# Patient Record
Sex: FEMALE | Race: BLACK OR AFRICAN AMERICAN | Employment: UNEMPLOYED
[De-identification: names, ages, dates, MRNs, and addresses within clinical notes are randomized per-mention and may not be internally consistent; named-entity substitution may affect disease eponyms.]

---

## 2024-09-16 ENCOUNTER — TELEPHONE (OUTPATIENT)
Facility: HOSPITAL | Age: 54
End: 2024-09-16

## 2024-10-11 ENCOUNTER — HOSPITAL ENCOUNTER (OUTPATIENT)
Facility: HOSPITAL | Age: 54
Setting detail: RECURRING SERIES
Discharge: HOME | End: 2024-10-14
Payer: OTHER GOVERNMENT

## 2024-10-11 PROCEDURE — 97110 THERAPEUTIC EXERCISES: CPT

## 2024-10-11 PROCEDURE — 97535 SELF CARE MNGMENT TRAINING: CPT

## 2024-10-11 PROCEDURE — 97140 MANUAL THERAPY 1/> REGIONS: CPT

## 2024-10-11 NOTE — PROGRESS NOTES
[facility]  [address] [facility] [address] [place] [address] Ph:[phone] Fx: [phone]    PLAN OF CARE/ Statement of Necessity for Physical Therapy Services           Patient name: Nacho Simpson Start of Care: 10/11/2024   Referral source: Jhonny Perkins* : 1970    Medical Diagnosis: Pain in right lower leg [M79.661]       Onset Date:  2024   Treatment Diagnosis: M25.571  RIGHT ANKLE PAIN                                      Prior Hospitalization: see medical history Provider#: [id number]   Medications: Verified on Patient Summary List     Comorbidities: BMI > 30, HTN    Prior Level of Function: The patient reports she was able to ambulate for recreation and exercise prior to onset.    The Plan of Care and following information is based on the information from the initial evaluation.    Assessment / key information:  The patient is a 54 year old female with a chief complaint of left ankle/heel pain that has been ongoing for over 6 months. She states that she had a prior ankle pain about a year or so ago with radiographs and eventual resolution, but reports that her ankle began hurting this year when she began a walking program. She cites pain in the back of the heel and states she will get burning. The patient denies imaging nor injection. She denies night splints. The patient has impairments consisting of pain, decreased ROM, decreased flexibility, decreased quality of life, and decreased ease of ambulation. She will benefit from skilled PT in order to address the aforementioned impairments.     Evaluation Complexity:  History:  MEDIUM  Complexity : 1-2 comorbidities / personal factors will impact the outcome/ POC ; Examination:  LOW Complexity : 1-2 Standardized tests and measures addressing body structure, function, activity limitation and / or participation in recreation  ;Presentation:  MEDIUM Complexity : Evolving with changing 
detail:       Objective Information/Functional Measures/Assessment    See POC    Patient will continue to benefit from skilled PT / OT services to modify and progress therapeutic interventions, analyze and address functional mobility deficits, analyze and address ROM deficits, analyze and address strength deficits, analyze and address soft tissue restrictions, analyze and cue for proper movement patterns, analyze and modify for postural abnormalities, and instruct in home and community integration to address functional deficits and attain remaining goals.    Progress toward goals / Updated goals:  []  See Progress Note/Recertification  Short Term Goals: To be accomplished in 2 weeks  The patient will demonstrate independence and compliance with HEP to maximize therapeutic benefit.   IE: issued HEP  The patient will improve DF ROM to 5 degrees actively to improve ease of ambulation.   IE: lacking 3 degrees from neutral  Long Term Goals: To be accomplished in 12 weeks  The patient will improve LEFS score to 47/80 in order to improve quality of life.   IE:   The patient will attain 12 degrees gastroc flexibility in order to improve ease of gait.   IE: lacking 3 degrees from neutral  The patient will attain 5/5 MMT posterior tib strength to improve stability in stance.   IE: 4+/5 MMT inversion  The patient will improve hip ABD strength of right hip to 5/5 MMT to maximize stability in stance.    IE: 3+/5 MMT    PLAN  Yes  Continue plan of care  []  Upgrade activities as tolerated  []  Discharge due to :  []  Other:    Dallas Wetzel PT    10/11/2024    10:56 AM    Future Appointments   Date Time Provider Department Center   10/15/2024  2:30 PM Dallas Wetzel PT [facility] [place]   10/18/2024  9:10 AM Ty Womack P[facility] [place]   10/21/2024 12:30 PM Ty Womack PTA [facility] [place]   10/23/2024  8:30 [facility] [place]   10/29/2024  9:10 AM [place]

## 2024-10-15 ENCOUNTER — HOSPITAL ENCOUNTER (OUTPATIENT)
Facility: HOSPITAL | Age: 54
Setting detail: RECURRING SERIES
Discharge: HOME | End: 2024-10-18
Payer: OTHER GOVERNMENT

## 2024-10-15 PROCEDURE — 97112 NEUROMUSCULAR REEDUCATION: CPT

## 2024-10-15 PROCEDURE — 97110 THERAPEUTIC EXERCISES: CPT

## 2024-10-15 PROCEDURE — 97140 MANUAL THERAPY 1/> REGIONS: CPT

## 2024-10-15 NOTE — PROGRESS NOTES
PHYSICAL / OCCUPATIONAL THERAPY - DAILY TREATMENT NOTE     Patient Name: Nacho Simpson    Date: 10/15/2024    : 1970  Insurance: Payor:  EAST / Plan:  EAST PRIME / Product Type: *No Product type* /      Patient  verified Yes     Visit #   Current / Total 2 24   Time   In / Out 2:30 3:10   Pain   In / Out 2-3/10 1-2/10   Subjective Functional Status/Changes: The patient reports compliance with HEP. She states she is not used to using her supportive shoes.   Changes to:  Allergies, Med Hx, Sx Hx?   no       TREATMENT AREA =  Pain in right ankle and joints of right foot [M25.571]    If an interpreting service is utilized for treatment of this patient, the contents of this document represent the material reviewed with the patient via the .     OBJECTIVE  Therapeutic Procedures:  Tx Min Billable or 1:1 Min (if diff from Tx Min) Procedure, Rationale, Specifics   22  82184 Therapeutic Exercise (timed):  increase ROM, strength, coordination, balance, and proprioception to improve patient's ability to progress to PLOF and address remaining functional goals. (see flow sheet as applicable)    Details if applicable:       10  49120 Neuromuscular Re-Education (timed):  improve balance, coordination, kinesthetic sense, posture, core stability and proprioception to improve patient's ability to develop conscious control of individual muscles and awareness of position of extremities in order to progress to PLOF and address remaining functional goals. (see flow sheet as applicable)    Details if applicable:     8  09966 Manual Therapy (timed):  decrease pain, increase ROM, and increase tissue extensibility to improve patient's ability to progress to PLOF and address remaining functional goals.  The manual therapy interventions were performed at a separate and distinct time from the therapeutic activities interventions . Details: achilles, soleus, gastroc Graston GT5 with light pressure, pt

## 2024-10-18 ENCOUNTER — HOSPITAL ENCOUNTER (OUTPATIENT)
Facility: HOSPITAL | Age: 54
Setting detail: RECURRING SERIES
Discharge: HOME | End: 2024-10-21
Payer: OTHER GOVERNMENT

## 2024-10-18 PROCEDURE — 97110 THERAPEUTIC EXERCISES: CPT

## 2024-10-18 PROCEDURE — 97140 MANUAL THERAPY 1/> REGIONS: CPT

## 2024-10-18 PROCEDURE — 97112 NEUROMUSCULAR REEDUCATION: CPT

## 2024-10-18 NOTE — PROGRESS NOTES
PHYSICAL / OCCUPATIONAL THERAPY - DAILY TREATMENT NOTE     Patient Name: Nacho Simpson    Date: 10/18/2024    : 1970  Insurance: Payor:  EAST / Plan:  EAST PRIME / Product Type: *No Product type* /      Patient  verified Yes     Visit #   Current / Total 3 24   Time   In / Out 9:10 9:50   Pain   In / Out 3-/10 0/10   Subjective Functional Status/Changes: Pt reports ankle is irritated today.   Changes to:  Allergies, Med Hx, Sx Hx?   no       TREATMENT AREA =  Pain in right ankle and joints of right foot [M25.571]    If an interpreting service is utilized for treatment of this patient, the contents of this document represent the material reviewed with the patient via the .     OBJECTIVE    Therapeutic Procedures:  Tx Min Billable or 1:1 Min (if diff from Tx Min) Procedure, Rationale, Specifics   24  31292 Therapeutic Exercise (timed):  increase ROM, strength, coordination, balance, and proprioception to improve patient's ability to progress to PLOF and address remaining functional goals. (see flow sheet as applicable)    Details if applicable:       8  36799 Neuromuscular Re-Education (timed):  improve balance, coordination, kinesthetic sense, posture, core stability and proprioception to improve patient's ability to develop conscious control of individual muscles and awareness of position of extremities in order to progress to PLOF and address remaining functional goals. (see flow sheet as applicable)    Details if applicable:  Squats, bridges.   8  47500 Manual Therapy (timed):  decrease pain, increase ROM, increase tissue extensibility, and decrease trigger points to improve patient's ability to progress to PLOF and address remaining functional goals.  The manual therapy interventions were performed at a separate and distinct time from the therapeutic activities interventions . Details:        Details if applicable:  Graston technique to Right gastroc, soleus and achilles

## 2024-10-21 ENCOUNTER — HOSPITAL ENCOUNTER (OUTPATIENT)
Facility: HOSPITAL | Age: 54
Setting detail: RECURRING SERIES
Discharge: HOME | End: 2024-10-24
Payer: OTHER GOVERNMENT

## 2024-10-21 PROCEDURE — 97112 NEUROMUSCULAR REEDUCATION: CPT

## 2024-10-21 PROCEDURE — 97110 THERAPEUTIC EXERCISES: CPT

## 2024-10-21 PROCEDURE — 97140 MANUAL THERAPY 1/> REGIONS: CPT

## 2024-10-21 NOTE — PROGRESS NOTES
PHYSICAL / OCCUPATIONAL THERAPY - DAILY TREATMENT NOTE     Patient Name: Nacho Simpson    Date: 10/21/2024    : 1970  Insurance: Payor:  EAST / Plan:  EAST PRIME / Product Type: *No Product type* /      Patient  verified Yes     Visit #   Current / Total 4 24   Time   In / Out 12:30 1:06   Pain   In / Out 3/10 2/10   Subjective Functional Status/Changes: Pt reports Right ankle is sore/painful today.   Changes to:  Allergies, Med Hx, Sx Hx?   no       TREATMENT AREA =  Pain in right ankle and joints of right foot [M25.571]    If an interpreting service is utilized for treatment of this patient, the contents of this document represent the material reviewed with the patient via the .     OBJECTIVE    Therapeutic Procedures:  Tx Min Billable or 1:1 Min (if diff from Tx Min) Procedure, Rationale, Specifics   20  14576 Therapeutic Exercise (timed):  increase ROM, strength, coordination, balance, and proprioception to improve patient's ability to progress to PLOF and address remaining functional goals. (see flow sheet as applicable)    Details if applicable:       8  28497 Neuromuscular Re-Education (timed):  improve balance, coordination, kinesthetic sense, posture, core stability and proprioception to improve patient's ability to develop conscious control of individual muscles and awareness of position of extremities in order to progress to PLOF and address remaining functional goals. (see flow sheet as applicable)    Details if applicable:  Squats, bridges.    8  05745 Manual Therapy (timed):  decrease pain, increase ROM, increase tissue extensibility, and decrease trigger points to improve patient's ability to progress to PLOF and address remaining functional goals.  The manual therapy interventions were performed at a separate and distinct time from the therapeutic activities interventions . Details:        Details if applicable:   Graston technique to Right gastroc, soleus

## 2024-10-22 ENCOUNTER — APPOINTMENT (OUTPATIENT)
Facility: HOSPITAL | Age: 54
End: 2024-10-22
Payer: OTHER GOVERNMENT

## 2024-10-23 ENCOUNTER — HOSPITAL ENCOUNTER (OUTPATIENT)
Facility: HOSPITAL | Age: 54
Setting detail: RECURRING SERIES
Discharge: HOME | End: 2024-10-26
Payer: OTHER GOVERNMENT

## 2024-10-23 PROCEDURE — 97110 THERAPEUTIC EXERCISES: CPT

## 2024-10-23 PROCEDURE — 97140 MANUAL THERAPY 1/> REGIONS: CPT

## 2024-10-23 PROCEDURE — 97112 NEUROMUSCULAR REEDUCATION: CPT

## 2024-10-23 NOTE — PROGRESS NOTES
PHYSICAL / OCCUPATIONAL THERAPY - DAILY TREATMENT NOTE     Patient Name: Nacho Simpson    Date: 10/23/2024    : 1970  Insurance: Payor:  EAST / Plan:  EAST PRIME / Product Type: *No Product type* /      Patient  verified Yes     Visit #   Current / Total 5 24   Time   In / Out 8:33 9:06   Pain   In / Out 1/10 0/10   Subjective Functional Status/Changes: Reports less pain today.   Changes to:  Allergies, Med Hx, Sx Hx?   no       TREATMENT AREA =  Pain in right ankle and joints of right foot [M25.571]    If an interpreting service is utilized for treatment of this patient, the contents of this document represent the material reviewed with the patient via the .     OBJECTIVE    Therapeutic Procedures:  Tx Min Billable or 1:1 Min (if diff from Tx Min) Procedure, Rationale, Specifics   17  40655 Therapeutic Exercise (timed):  increase ROM, strength, coordination, balance, and proprioception to improve patient's ability to progress to PLOF and address remaining functional goals. (see flow sheet as applicable)    Details if applicable:       8  02362 Neuromuscular Re-Education (timed):  improve balance, coordination, kinesthetic sense, posture, core stability and proprioception to improve patient's ability to develop conscious control of individual muscles and awareness of position of extremities in order to progress to PLOF and address remaining functional goals. (see flow sheet as applicable)    Details if applicable:  Squats, bridges.    8  96671 Manual Therapy (timed):  decrease pain, increase ROM, increase tissue extensibility, and decrease trigger points to improve patient's ability to progress to PLOF and address remaining functional goals.  The manual therapy interventions were performed at a separate and distinct time from the therapeutic activities interventions . Details:        Details if applicable:  Graston technique to Right gastroc, soleus and achilles tendon. Pt

## 2024-10-29 ENCOUNTER — HOSPITAL ENCOUNTER (OUTPATIENT)
Facility: HOSPITAL | Age: 54
Setting detail: RECURRING SERIES
Discharge: HOME | End: 2024-11-01
Payer: OTHER GOVERNMENT

## 2024-10-29 PROCEDURE — 97140 MANUAL THERAPY 1/> REGIONS: CPT

## 2024-10-29 PROCEDURE — 97110 THERAPEUTIC EXERCISES: CPT

## 2024-10-29 PROCEDURE — 97112 NEUROMUSCULAR REEDUCATION: CPT

## 2024-10-29 NOTE — PROGRESS NOTES
PHYSICAL / OCCUPATIONAL THERAPY - DAILY TREATMENT NOTE     Patient Name: Nacho Simpson    Date: 10/29/2024    : 1970  Insurance: Payor:  EAST / Plan:  EAST PRIME / Product Type: *No Product type* /      Patient  verified Yes     Visit #   Current / Total 6 24   Time   In / Out 9:09 9:50   Pain   In / Out 0 0   Subjective Functional Status/Changes: No new changes reported   Changes to:  Allergies, Med Hx, Sx Hx?   no       TREATMENT AREA =  Pain in right ankle and joints of right foot [M25.571]    If an interpreting service is utilized for treatment of this patient, the contents of this document represent the material reviewed with the patient via the .     OBJECTIVE        Therapeutic Procedures:  Tx Min Billable or 1:1 Min (if diff from Tx Min) Procedure, Rationale, Specifics   24  71349 Therapeutic Exercise (timed):  increase ROM, strength, coordination, balance, and proprioception to improve patient's ability to progress to PLOF and address remaining functional goals. (see flow sheet as applicable)    Details if applicable:       9  49397 Neuromuscular Re-Education (timed):  improve balance, coordination, kinesthetic sense, posture, core stability and proprioception to improve patient's ability to develop conscious control of individual muscles and awareness of position of extremities in order to progress to PLOF and address remaining functional goals. (see flow sheet as applicable)    Details if applicable:     8  15468 Manual Therapy (timed):  decrease pain, increase ROM, and increase tissue extensibility to improve patient's ability to progress to PLOF and address remaining functional goals.  The manual therapy interventions were performed at a separate and distinct time from the therapeutic activities interventions . Details: IASTM using Graston on R gastroc, soleus and proximal to achilles with pt in prone.      Details if applicable:           Details if applicable:

## 2024-10-31 ENCOUNTER — HOSPITAL ENCOUNTER (OUTPATIENT)
Facility: HOSPITAL | Age: 54
Setting detail: RECURRING SERIES
Discharge: HOME | End: 2024-11-03
Payer: OTHER GOVERNMENT

## 2024-10-31 PROCEDURE — 97110 THERAPEUTIC EXERCISES: CPT

## 2024-10-31 PROCEDURE — 97112 NEUROMUSCULAR REEDUCATION: CPT

## 2024-10-31 PROCEDURE — 97140 MANUAL THERAPY 1/> REGIONS: CPT

## 2024-10-31 NOTE — PROGRESS NOTES
PHYSICAL / OCCUPATIONAL THERAPY - DAILY TREATMENT NOTE     Patient Name: Nacho Simpson    Date: 10/31/2024    : 1970  Insurance: Payor:  EAST / Plan:  EAST PRIME / Product Type: *No Product type* /      Patient  verified Yes     Visit #   Current / Total 7 24   Time   In / Out 9:51 10:30   Pain   In / Out 2 0   Subjective Functional Status/Changes: Pt reported that with he hard shoes her heel hurts   Changes to:  Allergies, Med Hx, Sx Hx?   no       TREATMENT AREA =  Pain in right ankle and joints of right foot [M25.571]    If an interpreting service is utilized for treatment of this patient, the contents of this document represent the material reviewed with the patient via the .     OBJECTIVE      Therapeutic Procedures:  Tx Min Billable or 1:1 Min (if diff from Tx Min) Procedure, Rationale, Specifics   23  18175 Therapeutic Exercise (timed):  increase ROM, strength, coordination, balance, and proprioception to improve patient's ability to progress to PLOF and address remaining functional goals. (see flow sheet as applicable)    Details if applicable:       8  16741 Neuromuscular Re-Education (timed):  improve balance, coordination, kinesthetic sense, posture, core stability and proprioception to improve patient's ability to develop conscious control of individual muscles and awareness of position of extremities in order to progress to PLOF and address remaining functional goals. (see flow sheet as applicable)    Details if applicable:     8  41680 Manual Therapy (timed):  decrease pain, increase ROM, and increase tissue extensibility to improve patient's ability to progress to PLOF and address remaining functional goals.  The manual therapy interventions were performed at a separate and distinct time from the therapeutic activities interventions . Details:    IASTM using Graston on R gastroc, soleus and proximal to achilles with pt in prone     Details if applicable:

## 2024-11-01 ENCOUNTER — APPOINTMENT (OUTPATIENT)
Facility: HOSPITAL | Age: 54
End: 2024-11-01
Payer: OTHER GOVERNMENT

## 2024-11-05 ENCOUNTER — HOSPITAL ENCOUNTER (OUTPATIENT)
Facility: HOSPITAL | Age: 54
Setting detail: RECURRING SERIES
Discharge: HOME | End: 2024-11-08
Payer: OTHER GOVERNMENT

## 2024-11-05 PROCEDURE — 97530 THERAPEUTIC ACTIVITIES: CPT

## 2024-11-05 PROCEDURE — 97140 MANUAL THERAPY 1/> REGIONS: CPT

## 2024-11-05 PROCEDURE — 97110 THERAPEUTIC EXERCISES: CPT

## 2024-11-05 PROCEDURE — 97112 NEUROMUSCULAR REEDUCATION: CPT

## 2024-11-05 NOTE — PROGRESS NOTES
[facility]  [address] [facility] [address] [place] [address] - Ph: [phone]   Fx: [phone]    PHYSICAL THERAPY PROGRESS NOTE        Patient name: Nacho Simpson Start of Care: 10/11/2024   Referral source: Jhonny Perkins Diane* : 1970               Medical Diagnosis: Pain in right lower leg [M79.661]        Onset Date:  2024   Treatment Diagnosis: M25.571  RIGHT ANKLE PAIN                                      Prior Hospitalization: see medical history Provider#: [id number]   Medications: Verified on Patient Summary List      Comorbidities: BMI > 30, HTN     Prior Level of Function: The patient reports she was able to ambulate for recreation and exercise prior to onset.    Reporting Period: 10/11/2024-2024  Visits from Start of Care: 8    Missed Visits: 0    Goals/Measure of Progress: To be achieved in 8 weeks:  Progress toward goals / Updated goals:  []  See Progress Note/Recertification  Short Term Goals: To be accomplished in 2 weeks  The patient will demonstrate independence and compliance with HEP to maximize therapeutic benefit.              IE: issued HEP              PN 2024: The patient reports compliance  The patient will improve DF ROM to 5 degrees actively to improve ease of ambulation.              IE: lacking 3 degrees from neutral               PN 2024: Progressing, 1 degree  Long Term Goals: To be accomplished in 12 weeks  The patient will improve LEFS score to 65/80 in order to improve quality of life.              IE: 47/80  PN 2024: Met, LEFS score 68/80  The patient will attain 12 degrees gastroc flexibility in order to improve ease of gait.              IE: lacking 3 degrees from neutral              PN 2024: Progressing, 3 degrees.   The patient will attain 5/5 MMT posterior tib strength to improve stability in stance.              IE: 4+/5 MMT inversion  PN 2024: Right Inversion

## 2024-11-05 NOTE — PROGRESS NOTES
PHYSICAL / OCCUPATIONAL THERAPY - DAILY TREATMENT NOTE     Patient Name: Nacho Simpson    Date: 2024    : 1970  Insurance: Payor:  EAST / Plan:  EAST PRIME / Product Type: *No Product type* /      Patient  verified Yes     Visit #   Current / Total 8 24   Time   In / Out 9:50 10:29   Pain   In / Out 4/10 010   Subjective Functional Status/Changes: The patient reports that she began having pain through the ball of her foot (right) on Saturday. She states the achilles has improved.   Changes to:  Allergies, Med Hx, Sx Hx?   no       TREATMENT AREA =  Pain in right ankle and joints of right foot [M25.571]    If an interpreting service is utilized for treatment of this patient, the contents of this document represent the material reviewed with the patient via the .     OBJECTIVE  Therapeutic Procedures:  Tx Min Billable or 1:1 Min (if diff from Tx Min) Procedure, Rationale, Specifics   13  63761 Therapeutic Exercise (timed):  increase ROM, strength, coordination, balance, and proprioception to improve patient's ability to progress to PLOF and address remaining functional goals. (see flow sheet as applicable)    Details if applicable:       8  73981 Neuromuscular Re-Education (timed):  improve balance, coordination, kinesthetic sense, posture, core stability and proprioception to improve patient's ability to develop conscious control of individual muscles and awareness of position of extremities in order to progress to PLOF and address remaining functional goals. (see flow sheet as applicable)    Details if applicable:     10  92693 Therapeutic Activity (timed):  use of dynamic activities replicating functional movements to increase ROM, strength, coordination, balance, and proprioception in order to improve patient's ability to progress to PLOF and address remaining functional goals.  (see flow sheet as applicable)     Details if applicable:     8  98980 Manual Therapy

## 2024-11-08 ENCOUNTER — HOSPITAL ENCOUNTER (OUTPATIENT)
Facility: HOSPITAL | Age: 54
Setting detail: RECURRING SERIES
Discharge: HOME | End: 2024-11-11
Payer: OTHER GOVERNMENT

## 2024-11-08 PROCEDURE — 97110 THERAPEUTIC EXERCISES: CPT

## 2024-11-08 PROCEDURE — 97530 THERAPEUTIC ACTIVITIES: CPT

## 2024-11-08 PROCEDURE — 97112 NEUROMUSCULAR REEDUCATION: CPT

## 2024-11-08 NOTE — PROGRESS NOTES
be accomplished in 12 weeks  The patient will improve LEFS score to 65/80 in order to improve quality of life.              IE: 47/80  PN 11/05/2024: Met, LEFS score 68/80  The patient will attain 12 degrees gastroc flexibility in order to improve ease of gait.              IE: lacking 3 degrees from neutral              PN 11/05/2024: Progressing, 3 degrees.   The patient will attain 5/5 MMT posterior tib strength to improve stability in stance.              IE: 4+/5 MMT inversion  PN 11/05/2024: Right Inversion strength 4+/5,    The patient will improve hip ABD strength of right hip to 5/5 MMT to maximize stability in stance.               IE: 3+/5 MMT              PN 11/05/2024: Remains 3+/5 MMT      PLAN  Yes  Continue plan of care  []  Upgrade activities as tolerated  []  Discharge due to :  []  Other:    Lesvia Khan PT    11/8/2024    8:42 AM    Future Appointments   Date Time Provider Department Center   11/8/2024  9:50 AM Lesvia Khan PT [facility]   11/13/2024 10:30 AM Kierra Quintanilla PT [facility]   11/14/2024  1:50 [facility]

## 2024-11-13 ENCOUNTER — HOSPITAL ENCOUNTER (OUTPATIENT)
Facility: HOSPITAL | Age: 54
Setting detail: RECURRING SERIES
Discharge: HOME | End: 2024-11-16
Payer: OTHER GOVERNMENT

## 2024-11-13 PROCEDURE — 97140 MANUAL THERAPY 1/> REGIONS: CPT

## 2024-11-13 PROCEDURE — 97112 NEUROMUSCULAR REEDUCATION: CPT

## 2024-11-13 PROCEDURE — 97110 THERAPEUTIC EXERCISES: CPT

## 2024-11-13 NOTE — PROGRESS NOTES
PHYSICAL / OCCUPATIONAL THERAPY - DAILY TREATMENT NOTE     Patient Name: Nacho Simpson    Date: 2024    : 1970  Insurance: Payor:  EAST / Plan:  EAST PRIME / Product Type: *No Product type* /      Patient  verified Yes     Visit #   Current / Total 10 24   Time   In / Out 10:37 11:13   Pain   In / Out 0 0   Subjective Functional Status/Changes: No new changes reported   Changes to:  Allergies, Med Hx, Sx Hx?   no       TREATMENT AREA =  Pain in right ankle and joints of right foot [M25.571]    If an interpreting service is utilized for treatment of this patient, the contents of this document represent the material reviewed with the patient via the .     OBJECTIVE      Therapeutic Procedures:  Tx Min Billable or 1:1 Min (if diff from Tx Min) Procedure, Rationale, Specifics   18  14146 Therapeutic Exercise (timed):  increase ROM, strength, coordination, balance, and proprioception to improve patient's ability to progress to PLOF and address remaining functional goals. (see flow sheet as applicable)    Details if applicable:       10  48249 Neuromuscular Re-Education (timed):  improve balance, coordination, kinesthetic sense, posture, core stability and proprioception to improve patient's ability to develop conscious control of individual muscles and awareness of position of extremities in order to progress to PLOF and address remaining functional goals. (see flow sheet as applicable)    Details if applicable:     8  39250 Manual Therapy (timed):  decrease pain, increase ROM, and increase tissue extensibility to improve patient's ability to progress to PLOF and address remaining functional goals.  The manual therapy interventions were performed at a separate and distinct time from the therapeutic activities interventions . Details:     IASTM using Graston on R gastroc, soleus and proximal to achilles with pt in prone     Details if applicable:           Details if

## 2024-11-14 ENCOUNTER — HOSPITAL ENCOUNTER (OUTPATIENT)
Facility: HOSPITAL | Age: 54
Setting detail: RECURRING SERIES
Discharge: HOME | End: 2024-11-17
Payer: OTHER GOVERNMENT

## 2024-11-14 PROCEDURE — 97112 NEUROMUSCULAR REEDUCATION: CPT

## 2024-11-14 PROCEDURE — 97110 THERAPEUTIC EXERCISES: CPT

## 2024-11-14 PROCEDURE — 97140 MANUAL THERAPY 1/> REGIONS: CPT

## 2024-11-14 NOTE — PROGRESS NOTES
PHYSICAL / OCCUPATIONAL THERAPY - DAILY TREATMENT NOTE     Patient Name: Nacho Simpson    Date: 2024    : 1970  Insurance: Payor:  EAST / Plan:  EAST PRIME / Product Type: *No Product type* /      Patient  verified Yes     Visit #   Current / Total 11 24   Time   In / Out 1:50 2:45   Pain   In / Out 1-2 0   Subjective Functional Status/Changes: Pt reported feeling tired today   Changes to:  Allergies, Med Hx, Sx Hx?   no       TREATMENT AREA =  Pain in right ankle and joints of right foot [M25.571]    If an interpreting service is utilized for treatment of this patient, the contents of this document represent the material reviewed with the patient via the .     OBJECTIVE    Modalities Rationale:     decrease edema, decrease pain, and increase tissue extensibility to improve patient's ability to progress to PLOF and address remaining functional goals.     min [] Estim Unattended, type/location:                                      []  w/ice    []  w/heat    min [] Estim Attended, type/location:                                     []  w/US     []  w/ice    []  w/heat    []  TENS insruct      min []  Mechanical Traction: type/lbs                   []  pro   []  sup   []  int   []  cont    []  before manual    []  after manual    min []  Ultrasound, settings/location:      min []  Iontophoresis w/ dexamethasone, location:                                               []  take home patch       []  in clinic        min  unbilled []  Ice     []  Heat    location/position:     min []  Paraffin,  details:    10 min [x]  Vasopneumatic Device, press/temp: LP/LT    min []  Whirlpool / Fluido:    If using vaso (only need to measure limb vaso being performed on)      pre-treatment girth : 51 cms      post-treatment girth : 51 cms      measured at (landmark location) :  Right ankle figure 8    min []  Other:    Skin assessment post-treatment (if applicable):    []  intact    []

## 2024-11-15 ENCOUNTER — APPOINTMENT (OUTPATIENT)
Facility: HOSPITAL | Age: 54
End: 2024-11-15
Payer: OTHER GOVERNMENT

## 2024-11-26 ENCOUNTER — HOSPITAL ENCOUNTER (OUTPATIENT)
Facility: HOSPITAL | Age: 54
Setting detail: RECURRING SERIES
Discharge: HOME | End: 2024-11-29
Payer: OTHER GOVERNMENT

## 2024-11-26 PROCEDURE — 97110 THERAPEUTIC EXERCISES: CPT

## 2024-11-26 PROCEDURE — 97112 NEUROMUSCULAR REEDUCATION: CPT

## 2024-11-26 PROCEDURE — 97016 VASOPNEUMATIC DEVICE THERAPY: CPT

## 2024-11-26 PROCEDURE — 97140 MANUAL THERAPY 1/> REGIONS: CPT

## 2024-11-26 NOTE — PROGRESS NOTES
Current: Progressed, Right hip abduction strength 4-/5, 11/13/2024    PLAN  Yes  Continue plan of care  []  Upgrade activities as tolerated  []  Discharge due to :  []  Other:    Kierra Quintanilla PT    11/26/2024    12:01 PM    Future Appointments   Date Time Provider Department Center   12/3/2024  1:10 PM K[facility] Quintanilla [facility]   12/10/2024 11:50 AM Kierra Quintanilla PT [facility]   12/17/2024  1:10 PM Kierra Quintanilla PT [facility]

## 2024-12-03 ENCOUNTER — HOSPITAL ENCOUNTER (OUTPATIENT)
Facility: HOSPITAL | Age: 54
Setting detail: RECURRING SERIES
Discharge: HOME | End: 2024-12-06
Payer: OTHER GOVERNMENT

## 2024-12-03 PROCEDURE — 97112 NEUROMUSCULAR REEDUCATION: CPT

## 2024-12-03 PROCEDURE — 97016 VASOPNEUMATIC DEVICE THERAPY: CPT

## 2024-12-03 PROCEDURE — 97140 MANUAL THERAPY 1/> REGIONS: CPT

## 2024-12-03 PROCEDURE — 97110 THERAPEUTIC EXERCISES: CPT

## 2024-12-03 NOTE — PROGRESS NOTES
[facility]  [address] [place] [address] - Ph: [phone]   Fx: [phone]    PHYSICAL THERAPY PROGRESS NOTE      Patient name: Nacho Simpson Start of Care: 10/11/2024    Referral source: Gregorio Jhonny Contreras* : 1970    Medical Diagnosis: Pain in right ankle and joints of right foot [M25.571]  Payor:  EAST / Plan:  EAST PRIME / Product Type: *No Product type* /  Onset Date:2024     Treatment Diagnosis: M25.571 RIGHT ANKLE PAIN    Prior Hospitalization: see medical history Provider#: [id number]   Comorbidities: BMI > 30, HTN   Prior Level of Function:The patient reports she was able to ambulate for recreation and exercise prior to onset.     Reporting Period: 10/11/2024-2024  Visits from Start of Care: 13    Missed Visits: 0    Goals/Measure of Progress: To be achieved in 12 weeks:    Short Term Goals: To be accomplished in 2 weeks  The patient will demonstrate independence and compliance with HEP to maximize therapeutic benefit.              IE: issued HEP              PN 2024: Met, pt reports compliance  The patient will improve DF ROM to 5 degrees actively to improve ease of ambulation.              IE: lacking 3 degrees from neutral  PN: 2024; Met, R DF AROM 10 deg    Long Term Goals: To be accomplished in 12 weeks  The patient will improve LEFS score to 65/80 in order to improve quality of life.              IE: 47/80  PN 2024: Met, LEFS score 65/80  The patient will attain 12 degrees gastroc flexibility in order to improve ease of gait.              IE: lacking 3 degrees from neutral              PN 2024: Progressed, 10 deg  The patient will attain 5/5 MMT posterior tib strength to improve stability in stance.              IE: 4+/5 MMT inversion  PN 2024: Right Inversion strength 4+/5,    The patient will improve hip ABD strength of right hip to 5/5 MMT to maximize

## 2024-12-03 NOTE — PROGRESS NOTES
PHYSICAL / OCCUPATIONAL THERAPY - DAILY TREATMENT NOTE     Patient Name: Nacho Simpson    Date: 12/3/2024    : 1970  Insurance: Payor: [email or web address] EAST / Plan:  EAST PRIME / Product Type: *No Product type* /      Patient  verified Yes     Visit #   Current / Total 13 24   Time   In / Out 1:10 2:06   Pain   In / Out 0 0   Subjective Functional Status/Changes: No new changes reported   Changes to:  Allergies, Med Hx, Sx Hx?   no       TREATMENT AREA =  Pain in right ankle and joints of right foot [M25.571]    If an interpreting service is utilized for treatment of this patient, the contents of this document represent the material reviewed with the patient via the .     OBJECTIVE    Modalities Rationale:     decrease edema, decrease pain, and increase tissue extensibility to improve patient's ability to progress to PLOF and address remaining functional goals.     min [] Estim Unattended, type/location:                                      []  w/ice    []  w/heat    min [] Estim Attended, type/location:                                     []  w/US     []  w/ice    []  w/heat    []  TENS insruct      min []  Mechanical Traction: type/lbs                   []  pro   []  sup   []  int   []  cont    []  before manual    []  after manual    min []  Ultrasound, settings/location:      min []  Iontophoresis w/ dexamethasone, location:                                               []  take home patch       []  in clinic        min  unbilled []  Ice     []  Heat    location/position:     min []  Paraffin,  details:    10 min [x]  Vasopneumatic Device, press/temp: LP/LT    min []  Whirlpool / Fluido:    If using vaso (only need to measure limb vaso being performed on)      pre-treatment girth : 58 cms      post-treatment girth : 58 cms      measured at (landmark location) :  Figure 8- R ankle    min []  Other:    Skin assessment post-treatment (if applicable):    []  intact    []  redness- no

## 2024-12-10 ENCOUNTER — HOSPITAL ENCOUNTER (OUTPATIENT)
Facility: HOSPITAL | Age: 54
Setting detail: RECURRING SERIES
Discharge: HOME | End: 2024-12-13
Payer: OTHER GOVERNMENT

## 2024-12-10 PROCEDURE — 97112 NEUROMUSCULAR REEDUCATION: CPT

## 2024-12-10 PROCEDURE — 97140 MANUAL THERAPY 1/> REGIONS: CPT

## 2024-12-10 PROCEDURE — 97110 THERAPEUTIC EXERCISES: CPT

## 2024-12-10 NOTE — PROGRESS NOTES
PHYSICAL / OCCUPATIONAL THERAPY - DAILY TREATMENT NOTE     Patient Name: Nacho Simpson    Date: 12/10/2024    : 1970  Insurance: Payor:  EAST / Plan:  EAST PRIME / Product Type: *No Product type* /      Patient  verified Yes     Visit #   Current / Total 14 24   Time   In / Out 11:50 12:32   Pain   In / Out 1-2 0   Subjective Functional Status/Changes: No new changes reported   Changes to:  Allergies, Med Hx, Sx Hx?   no       TREATMENT AREA =  Pain in right ankle and joints of right foot [M25.571]    If an interpreting service is utilized for treatment of this patient, the contents of this document represent the material reviewed with the patient via the .     OBJECTIVE      Therapeutic Procedures:  Tx Min Billable or 1:1 Min (if diff from Tx Min) Procedure, Rationale, Specifics   24  67357 Therapeutic Exercise (timed):  increase ROM, strength, coordination, balance, and proprioception to improve patient's ability to progress to PLOF and address remaining functional goals. (see flow sheet as applicable)    Details if applicable:       10  08725 Neuromuscular Re-Education (timed):  improve balance, coordination, kinesthetic sense, posture, core stability and proprioception to improve patient's ability to develop conscious control of individual muscles and awareness of position of extremities in order to progress to PLOF and address remaining functional goals. (see flow sheet as applicable)    Details if applicable:     8  39341 Manual Therapy (timed):  decrease pain, increase ROM, and increase tissue extensibility to improve patient's ability to progress to PLOF and address remaining functional goals.  The manual therapy interventions were performed at a separate and distinct time from the therapeutic activities interventions . Details:    IASTM using Graston on R gastroc, soleus and proximal to achilles with pt in prone * avoiding varicose veins     Details if applicable:

## 2024-12-17 ENCOUNTER — HOSPITAL ENCOUNTER (OUTPATIENT)
Facility: HOSPITAL | Age: 54
Setting detail: RECURRING SERIES
Discharge: HOME | End: 2024-12-20
Payer: OTHER GOVERNMENT

## 2024-12-17 PROCEDURE — 97110 THERAPEUTIC EXERCISES: CPT

## 2024-12-17 PROCEDURE — 97530 THERAPEUTIC ACTIVITIES: CPT

## 2024-12-17 PROCEDURE — 97112 NEUROMUSCULAR REEDUCATION: CPT

## 2024-12-17 NOTE — PROGRESS NOTES
[facility]  [address]  [place] [address]  [phone] fax    Physical Therapy Discharge Summary  Patient name: Nacho Simpson Start of Care: 10/11/2024    Referral source: Jhonny Perkins* : 1970   Medical/Treatment Diagnosis: Pain in right ankle and joints of right foot [M25.571]  Payor:  EAST / Plan:  EAST PRIME / Product Type: *No Product type* /  Onset Date:2024      Prior Hospitalization: see medical history Provider#: [id number]   Medications: Verified on Patient Summary List    Comorbidities: BMI > 30, HTN   Prior Level of Function:The patient reports she was able to ambulate for recreation and exercise prior to onset.     Visits from Start of Care: 15    Missed Visits: 0    Reporting Period : 10/11/2024 to 2024    Goals/Measure of Progress:  Short Term Goals: To be accomplished in 2 weeks  The patient will demonstrate independence and compliance with HEP to maximize therapeutic benefit.              IE: issued HEP             Current: Met, pt reports compliance  The patient will improve DF ROM to 5 degrees actively to improve ease of ambulation.              IE: lacking 3 degrees from neutral  Current: Met, R DF AROM 12 deg     Long Term Goals: To be accomplished in 12 weeks  The patient will improve LEFS score to 65/80 in order to improve quality of life.              IE: 47/80  Current: Met, LEFS score 73/80  The patient will attain 12 degrees gastroc flexibility in order to improve ease of gait.              IE: lacking 3 degrees from neutral             Current: Progressed, 10 deg  The patient will attain 5/5 MMT posterior tib strength to improve stability in stance.              IE: 4+/5 MMT inversion   Current: Met, Right inversion strength 5/5  The patient will improve hip ABD strength of right hip to 5/5 MMT to maximize stability in stance.               IE: 3+/5 MMT            Current:

## 2024-12-17 NOTE — PROGRESS NOTES
PHYSICAL / OCCUPATIONAL THERAPY - DAILY TREATMENT NOTE     Patient Name: Nacho Simpson    Date: 2024    : 1970  Insurance: Payor:  EAST / Plan:  EAST PRIME / Product Type: *No Product type* /      Patient  verified Yes     Visit #   Current / Total 15 24   Time   In / Out 1:11 1:51   Pain   In / Out 1 0   Subjective Functional Status/Changes: No new changes reported   Changes to:  Allergies, Med Hx, Sx Hx?   no       TREATMENT AREA =  Pain in right ankle and joints of right foot [M25.571]    If an interpreting service is utilized for treatment of this patient, the contents of this document represent the material reviewed with the patient via the .     OBJECTIVE      Therapeutic Procedures:  Tx Min Billable or 1:1 Min (if diff from Tx Min) Procedure, Rationale, Specifics   24  86716 Therapeutic Exercise (timed):  increase ROM, strength, coordination, balance, and proprioception to improve patient's ability to progress to PLOF and address remaining functional goals. (see flow sheet as applicable)    Details if applicable:       8  58210 Neuromuscular Re-Education (timed):  improve balance, coordination, kinesthetic sense, posture, core stability and proprioception to improve patient's ability to develop conscious control of individual muscles and awareness of position of extremities in order to progress to PLOF and address remaining functional goals. (see flow sheet as applicable)    Details if applicable:     8  56862 Therapeutic Activity (timed):  use of dynamic activities replicating functional movements to increase ROM, strength, coordination, balance, and proprioception in order to improve patient's ability to progress to PLOF and address remaining functional goals.  (see flow sheet as applicable)  IASTM using Graston on R gastroc, soleus and proximal to achilles with pt in prone * avoiding varicose veins        Details if applicable:           Details if